# Patient Record
Sex: FEMALE | Race: WHITE | Employment: UNEMPLOYED | URBAN - METROPOLITAN AREA
[De-identification: names, ages, dates, MRNs, and addresses within clinical notes are randomized per-mention and may not be internally consistent; named-entity substitution may affect disease eponyms.]

---

## 2018-04-09 ENCOUNTER — HOSPITAL ENCOUNTER (OUTPATIENT)
Dept: PHYSICAL THERAPY | Age: 83
Setting detail: THERAPIES SERIES
Discharge: HOME | End: 2018-04-09
Payer: MEDICARE

## 2018-04-09 PROCEDURE — G8979 MOBILITY GOAL STATUS: HCPCS

## 2018-04-09 PROCEDURE — G8980 MOBILITY D/C STATUS: HCPCS

## 2018-04-09 PROCEDURE — G8978 MOBILITY CURRENT STATUS: HCPCS

## 2018-04-09 PROCEDURE — 97162 PT EVAL MOD COMPLEX 30 MIN: CPT

## 2018-04-09 ASSESSMENT — PAIN DESCRIPTION - ORIENTATION: ORIENTATION: RIGHT;ANTERIOR

## 2018-04-09 ASSESSMENT — PAIN DESCRIPTION - DESCRIPTORS: DESCRIPTORS: PRESSURE;THROBBING

## 2018-04-09 ASSESSMENT — PAIN DESCRIPTION - FREQUENCY: FREQUENCY: INTERMITTENT

## 2018-04-09 ASSESSMENT — PAIN SCALES - GENERAL: PAINLEVEL_OUTOF10: 0

## 2018-04-09 ASSESSMENT — PAIN DESCRIPTION - PAIN TYPE: TYPE: ACUTE PAIN

## 2018-04-09 ASSESSMENT — PAIN DESCRIPTION - LOCATION: LOCATION: HEAD

## 2018-06-28 ENCOUNTER — CLINICAL DOCUMENTATION (OUTPATIENT)
Dept: PHYSICAL THERAPY | Age: 83
End: 2018-06-28

## 2022-07-11 LAB
ANION GAP SERPL CALCULATED.3IONS-SCNC: 13 MEQ/L (ref 9–15)
BASOPHILS ABSOLUTE: 0.1 K/UL (ref 0–0.2)
BASOPHILS RELATIVE PERCENT: 0.8 %
BUN BLDV-MCNC: 13 MG/DL (ref 8–23)
CALCIUM SERPL-MCNC: 8.8 MG/DL (ref 8.5–9.9)
CHLORIDE BLD-SCNC: 105 MEQ/L (ref 95–107)
CHOLESTEROL, TOTAL: 150 MG/DL (ref 0–199)
CO2: 24 MEQ/L (ref 20–31)
CREAT SERPL-MCNC: 0.59 MG/DL (ref 0.5–0.9)
EOSINOPHILS ABSOLUTE: 0 K/UL (ref 0–0.7)
EOSINOPHILS RELATIVE PERCENT: 0.6 %
GFR AFRICAN AMERICAN: >60
GFR NON-AFRICAN AMERICAN: >60
GLUCOSE BLD-MCNC: 134 MG/DL (ref 70–99)
HCT VFR BLD CALC: 48 % (ref 37–47)
HDLC SERPL-MCNC: 34 MG/DL (ref 40–59)
HEMOGLOBIN: 16 G/DL (ref 12–16)
LDL CHOLESTEROL CALCULATED: 100 MG/DL (ref 0–129)
LYMPHOCYTES ABSOLUTE: 1.3 K/UL (ref 1–4.8)
LYMPHOCYTES RELATIVE PERCENT: 21 %
MCH RBC QN AUTO: 31.2 PG (ref 27–31.3)
MCHC RBC AUTO-ENTMCNC: 33.2 % (ref 33–37)
MCV RBC AUTO: 94 FL (ref 82–100)
MONOCYTES ABSOLUTE: 0.4 K/UL (ref 0.2–0.8)
MONOCYTES RELATIVE PERCENT: 5.9 %
NEUTROPHILS ABSOLUTE: 4.6 K/UL (ref 1.4–6.5)
NEUTROPHILS RELATIVE PERCENT: 71.7 %
PDW BLD-RTO: 13.7 % (ref 11.5–14.5)
PLATELET # BLD: 128 K/UL (ref 130–400)
POTASSIUM SERPL-SCNC: 3.8 MEQ/L (ref 3.4–4.9)
RBC # BLD: 5.11 M/UL (ref 4.2–5.4)
SODIUM BLD-SCNC: 142 MEQ/L (ref 135–144)
TRIGL SERPL-MCNC: 78 MG/DL (ref 0–150)
WBC # BLD: 6.4 K/UL (ref 4.8–10.8)

## 2022-07-11 RX ORDER — LANOLIN ALCOHOL/MO/W.PET/CERES
6 CREAM (GRAM) TOPICAL NIGHTLY PRN
COMMUNITY

## 2022-07-11 RX ORDER — AMOXICILLIN 500 MG/1
500 CAPSULE ORAL 3 TIMES DAILY
COMMUNITY

## 2022-07-11 RX ORDER — ATORVASTATIN CALCIUM 40 MG/1
40 TABLET, FILM COATED ORAL DAILY
COMMUNITY

## 2022-07-11 RX ORDER — PANTOPRAZOLE SODIUM 40 MG/1
40 TABLET, DELAYED RELEASE ORAL DAILY
COMMUNITY

## 2022-07-11 RX ORDER — DILTIAZEM HYDROCHLORIDE 120 MG/1
120 CAPSULE, EXTENDED RELEASE ORAL DAILY
COMMUNITY

## 2022-07-12 ENCOUNTER — OFFICE VISIT (OUTPATIENT)
Dept: GERIATRIC MEDICINE | Age: 87
End: 2022-07-12
Payer: MEDICARE

## 2022-07-12 DIAGNOSIS — M15.9 OSTEOARTHRITIS OF MULTIPLE JOINTS, UNSPECIFIED OSTEOARTHRITIS TYPE: ICD-10-CM

## 2022-07-12 DIAGNOSIS — I10 ESSENTIAL HYPERTENSION: ICD-10-CM

## 2022-07-12 DIAGNOSIS — G20 PARKINSON'S DISEASE (HCC): Primary | ICD-10-CM

## 2022-07-12 DIAGNOSIS — I48.91 ATRIAL FIBRILLATION, UNSPECIFIED TYPE (HCC): ICD-10-CM

## 2022-07-12 DIAGNOSIS — R26.81 UNSTEADINESS: ICD-10-CM

## 2022-07-12 PROCEDURE — 1123F ACP DISCUSS/DSCN MKR DOCD: CPT | Performed by: INTERNAL MEDICINE

## 2022-07-12 PROCEDURE — 99305 1ST NF CARE MODERATE MDM 35: CPT | Performed by: INTERNAL MEDICINE

## 2022-07-13 ENCOUNTER — OFFICE VISIT (OUTPATIENT)
Dept: GERIATRIC MEDICINE | Age: 87
End: 2022-07-13
Payer: MEDICARE

## 2022-07-13 DIAGNOSIS — I48.91 ATRIAL FIBRILLATION, UNSPECIFIED TYPE (HCC): ICD-10-CM

## 2022-07-13 DIAGNOSIS — I10 ESSENTIAL HYPERTENSION: ICD-10-CM

## 2022-07-13 DIAGNOSIS — R13.10 DYSPHAGIA, UNSPECIFIED TYPE: ICD-10-CM

## 2022-07-13 DIAGNOSIS — I95.1 ORTHOSTATIC HYPOTENSION: Primary | ICD-10-CM

## 2022-07-13 DIAGNOSIS — Z86.73 H/O: CVA (CEREBROVASCULAR ACCIDENT): ICD-10-CM

## 2022-07-13 DIAGNOSIS — G20 PARKINSON'S DISEASE (HCC): ICD-10-CM

## 2022-07-13 DIAGNOSIS — N39.0 URINARY TRACT INFECTION WITHOUT HEMATURIA, SITE UNSPECIFIED: ICD-10-CM

## 2022-07-13 DIAGNOSIS — I25.10 CORONARY ARTERY DISEASE INVOLVING NATIVE HEART, UNSPECIFIED VESSEL OR LESION TYPE, UNSPECIFIED WHETHER ANGINA PRESENT: ICD-10-CM

## 2022-07-13 DIAGNOSIS — E78.5 HYPERLIPIDEMIA, UNSPECIFIED HYPERLIPIDEMIA TYPE: ICD-10-CM

## 2022-07-13 PROCEDURE — 1123F ACP DISCUSS/DSCN MKR DOCD: CPT | Performed by: NURSE PRACTITIONER

## 2022-07-13 PROCEDURE — 99310 SBSQ NF CARE HIGH MDM 45: CPT | Performed by: NURSE PRACTITIONER

## 2022-07-14 LAB
ANION GAP SERPL CALCULATED.3IONS-SCNC: 13 MEQ/L (ref 9–15)
BASOPHILS ABSOLUTE: 0 K/UL (ref 0–0.2)
BASOPHILS RELATIVE PERCENT: 0.7 %
BUN BLDV-MCNC: 13 MG/DL (ref 8–23)
CALCIUM SERPL-MCNC: 9.5 MG/DL (ref 8.5–9.9)
CHLORIDE BLD-SCNC: 104 MEQ/L (ref 95–107)
CHOLESTEROL, TOTAL: 167 MG/DL (ref 0–199)
CO2: 22 MEQ/L (ref 20–31)
CREAT SERPL-MCNC: 0.49 MG/DL (ref 0.5–0.9)
EOSINOPHILS ABSOLUTE: 0.1 K/UL (ref 0–0.7)
EOSINOPHILS RELATIVE PERCENT: 1.1 %
GFR AFRICAN AMERICAN: >60
GFR NON-AFRICAN AMERICAN: >60
GLUCOSE BLD-MCNC: 95 MG/DL (ref 70–99)
HCT VFR BLD CALC: 48 % (ref 37–47)
HDLC SERPL-MCNC: 35 MG/DL (ref 40–59)
HEMOGLOBIN: 15.6 G/DL (ref 12–16)
LDL CHOLESTEROL CALCULATED: 112 MG/DL (ref 0–129)
LYMPHOCYTES ABSOLUTE: 1.6 K/UL (ref 1–4.8)
LYMPHOCYTES RELATIVE PERCENT: 27 %
MCH RBC QN AUTO: 30.8 PG (ref 27–31.3)
MCHC RBC AUTO-ENTMCNC: 32.4 % (ref 33–37)
MCV RBC AUTO: 95.1 FL (ref 82–100)
MONOCYTES ABSOLUTE: 0.5 K/UL (ref 0.2–0.8)
MONOCYTES RELATIVE PERCENT: 7.7 %
NEUTROPHILS ABSOLUTE: 3.8 K/UL (ref 1.4–6.5)
NEUTROPHILS RELATIVE PERCENT: 63.5 %
PDW BLD-RTO: 14 % (ref 11.5–14.5)
PLATELET # BLD: 123 K/UL (ref 130–400)
POTASSIUM SERPL-SCNC: 3.7 MEQ/L (ref 3.4–4.9)
RBC # BLD: 5.04 M/UL (ref 4.2–5.4)
SODIUM BLD-SCNC: 139 MEQ/L (ref 135–144)
TRIGL SERPL-MCNC: 102 MG/DL (ref 0–150)
WBC # BLD: 6.1 K/UL (ref 4.8–10.8)

## 2022-07-15 ENCOUNTER — OFFICE VISIT (OUTPATIENT)
Dept: GERIATRIC MEDICINE | Age: 87
End: 2022-07-15
Payer: MEDICARE

## 2022-07-15 DIAGNOSIS — R11.0 NAUSEA: Primary | ICD-10-CM

## 2022-07-15 DIAGNOSIS — H04.123 DRY EYES: ICD-10-CM

## 2022-07-15 PROCEDURE — 1123F ACP DISCUSS/DSCN MKR DOCD: CPT | Performed by: NURSE PRACTITIONER

## 2022-07-15 PROCEDURE — 99309 SBSQ NF CARE MODERATE MDM 30: CPT | Performed by: NURSE PRACTITIONER

## 2022-07-18 LAB
BASOPHILS ABSOLUTE: 0 K/UL (ref 0–0.2)
BASOPHILS RELATIVE PERCENT: 1 %
EOSINOPHILS ABSOLUTE: 0.1 K/UL (ref 0–0.7)
EOSINOPHILS RELATIVE PERCENT: 1.4 %
HCT VFR BLD CALC: 47.7 % (ref 37–47)
HEMOGLOBIN: 15.9 G/DL (ref 12–16)
LYMPHOCYTES ABSOLUTE: 1.5 K/UL (ref 1–4.8)
LYMPHOCYTES RELATIVE PERCENT: 33 %
MCH RBC QN AUTO: 31 PG (ref 27–31.3)
MCHC RBC AUTO-ENTMCNC: 33.3 % (ref 33–37)
MCV RBC AUTO: 93.1 FL (ref 82–100)
MONOCYTES ABSOLUTE: 0.3 K/UL (ref 0.2–0.8)
MONOCYTES RELATIVE PERCENT: 6.8 %
NEUTROPHILS ABSOLUTE: 2.6 K/UL (ref 1.4–6.5)
NEUTROPHILS RELATIVE PERCENT: 57.8 %
PDW BLD-RTO: 13.8 % (ref 11.5–14.5)
PLATELET # BLD: 137 K/UL (ref 130–400)
RBC # BLD: 5.13 M/UL (ref 4.2–5.4)
WBC # BLD: 4.5 K/UL (ref 4.8–10.8)

## 2022-07-22 ENCOUNTER — OFFICE VISIT (OUTPATIENT)
Dept: GERIATRIC MEDICINE | Age: 87
End: 2022-07-22
Payer: MEDICARE

## 2022-07-22 DIAGNOSIS — E44.0 MODERATE PROTEIN-CALORIE MALNUTRITION (HCC): Primary | ICD-10-CM

## 2022-07-22 PROCEDURE — 1123F ACP DISCUSS/DSCN MKR DOCD: CPT | Performed by: NURSE PRACTITIONER

## 2022-07-22 PROCEDURE — 99309 SBSQ NF CARE MODERATE MDM 30: CPT | Performed by: NURSE PRACTITIONER

## 2022-07-27 ENCOUNTER — OFFICE VISIT (OUTPATIENT)
Dept: GERIATRIC MEDICINE | Age: 87
End: 2022-07-27
Payer: MEDICARE

## 2022-07-27 DIAGNOSIS — R11.0 NAUSEA: Primary | ICD-10-CM

## 2022-07-27 PROCEDURE — 99309 SBSQ NF CARE MODERATE MDM 30: CPT | Performed by: NURSE PRACTITIONER

## 2022-07-27 PROCEDURE — 1123F ACP DISCUSS/DSCN MKR DOCD: CPT | Performed by: NURSE PRACTITIONER

## 2022-08-07 PROBLEM — N39.0 URINARY TRACT INFECTION WITHOUT HEMATURIA: Status: ACTIVE | Noted: 2022-08-07

## 2022-08-07 PROBLEM — I25.10 CORONARY ARTERY DISEASE INVOLVING NATIVE HEART: Status: ACTIVE | Noted: 2022-08-07

## 2022-08-07 PROBLEM — E78.5 HYPERLIPIDEMIA: Status: ACTIVE | Noted: 2022-08-07

## 2022-08-07 PROBLEM — R13.10 DYSPHAGIA: Status: ACTIVE | Noted: 2022-08-07

## 2022-08-07 PROBLEM — I95.1 ORTHOSTATIC HYPOTENSION: Status: ACTIVE | Noted: 2022-08-07

## 2022-08-07 PROBLEM — G20 PARKINSON'S DISEASE (HCC): Status: ACTIVE | Noted: 2022-08-07

## 2022-08-07 PROBLEM — I48.91 ATRIAL FIBRILLATION (HCC): Status: ACTIVE | Noted: 2022-08-07

## 2022-08-07 PROBLEM — I10 ESSENTIAL HYPERTENSION: Status: ACTIVE | Noted: 2022-08-07

## 2022-08-07 PROBLEM — Z86.73 H/O: CVA (CEREBROVASCULAR ACCIDENT): Status: ACTIVE | Noted: 2022-08-07

## 2022-08-07 NOTE — PROGRESS NOTES
Nursing Home:  [address] [place] [address]    The patient is being seen today for follow-up after recent admission to this facility for:  Chief Complaint   Patient presents with    Follow-Up from Hospital         SUBJECTIVE: Patient being seen today for follow-up after recent admission to this facility with primary diagnosis of orthostatic hypotension, UTI, CAD, A. fib, history of CVA, essential hypertension, hyperlipidemia, dysphagia, and Parkinson's disease. FAMILY AND SOCIAL HISTORY:  Refer to H&P. No past medical history on file. No family history on file.   Social History     Socioeconomic History    Marital status: Unknown     Spouse name: Not on file    Number of children: Not on file    Years of education: Not on file    Highest education level: Not on file   Occupational History    Not on file   Tobacco Use    Smoking status: Not on file    Smokeless tobacco: Not on file   Substance and Sexual Activity    Alcohol use: Not on file    Drug use: Not on file    Sexual activity: Not on file   Other Topics Concern    Not on file   Social History Narrative    Not on file     Social Determinants of Health     Financial Resource Strain: Not on file   Food Insecurity: Not on file   Transportation Needs: Not on file   Physical Activity: Not on file   Stress: Not on file   Social Connections: Not on file   Intimate Partner Violence: Not on file   Housing Stability: Not on file     ALLERGIES:  Gadolinium derivatives    MEDICATIONS: Acetaminophen 325 mg 2 tabs p.o. every 4 as needed for fever or pain, amoxicillin 500 mg capsule p.o. 3 times daily, apixaban 2.5 mg tab p.o. twice daily, atorvastatin calcium 40 mg tab p.o. daily, diltiazem CD extended release tab 120 mg tab p.o. daily, Dulcolax suppository 10 mg rectal suppository daily as needed, Alannah-Spur liquid 100 mg per 5 mL give 10 mL p.o. every 4 as needed, melatonin 3 mg tab 2 tabs p.o. daily as needed for insomnia total dose 6 mg, metoprolol tartrate 25 mg tab p.o. twice daily, milk of magnesia suspension 1200 mg per 15 mL give 30 mL p.o. daily as needed, Mylanta suspension 200-200-20 milligrams per 5 mL give 30 mL p.o. every 4 as needed, pantoprazole sodium tablet delayed release 40 mg tab p.o. daily, Zofran 4 mg tab p.o. every 6 as needed. REVIEW OF SYSTEMS:  Resident denies any headache, dizziness, blurred vision. She denies any fever, chills, sore throat. She denies any rashes, bruises, or open areas. She denies any chest pain, chest discomfort, or heart palpitations. She denies any shortness of breath or cough. She reports persistent nausea. No vomiting, or abdominal pain. She denies any urinary urgency, frequency, or dysuria. She denies any constipation or diarrhea. She states she is eating okay when not nauseated, sleeping okay, and she currently has no pain. PHYSICAL EXAMINATION:  Most recent vital signs: Blood pressure 123/68, temp 97.8, heart rate 89, respirations 18, pulse ox 98% room air, weight 111 pounds. Constitutional:  Resident is a 80 y.o. female alert, frail, pleasant, and cooperative with exam.  In no apparent distress. Integument:  Pink, warm, dry. No visible rashes, bruises, or open areas. HEENT:  Normocephalic, atraumatic. External ears appear to be within normal limits. Hard of hearing. Conjunctivae pink. Sclerae nonicteric. Mucous membranes pink, moist.  No oropharyngeal exudate. Neck:  Supple. No cervical or clavicular lymphadenopathy. No masses noted. Cardiovascular:  Heart rate regular rate and rhythm. No murmurs, gallops, rubs noted. Respiratory:  Lung sounds Normal.  Respirations nonlabored. The patient is not using accessory muscles with breathing. Current pulse ox 98% room air. Abdomen:  Soft, nontender, nondistended, normoactive bowel sounds. No masses noted. Musculoskeletal: No muscle tenderness. No joint tenderness. PV:  Peripheral pulses present.   She  does not have any edema to BLE. Psychological: Mood stable. Affect pleasant. Speech normal rate and tone. ASSESSMENT AND PLAN:      Diagnosis Orders   1. Orthostatic hypotension        2. Urinary tract infection without hematuria, site unspecified        3. Coronary artery disease involving native heart, unspecified vessel or lesion type, unspecified whether angina present        4. [facility]rial fibrillation, unspecified type ([facility]        5. H/O: CVA (cerebrovascular accident)        6. Essential hypertension        7. Hyperlipidemia, unspecified hyperlipidemia type        8. Dysphagia, unspecified type        9. Parkinson's disease ([facility]             No further episodes since admission to this facility. TRISTAN hose knee-high on in the a.m. off in the p.m. Tolerating amoxicillin without incident. No chest pain or discomfort. No palpitations that she is aware of. Continue apixaban and diltiazem as ordered. No extension of her symptoms. PT OT eval and treat. Blood pressure is stable. No hypertensive or hypotensive events. Metoprolol as ordered. Atorvastatin as ordered. Lipid panel in the morning. Speech therapy eval and treat. She is tolerating her diet as ordered. No exacerbation of her symptoms. PT/OT eval and treat. I have reviewed this resident's medication and treatment plan as well as most recent lab work. CBC, BMP, lipid panel in the morning. No further changes will be made at this time. Return in about 1 week (around 7/20/2022), or if symptoms worsen or fail to improve. Please note this report is partially produced by using speech recognition hardware. It may contain errors related to the system, including grammar, punctuation and spelling as well as words and phrases that may seem inaccurate. For any questions or concerns feel free to contact me for clarification        Electronically Signed By: Lucien Amin. Abi AGOSTO CNP on 8/7/22       ________________________    Lucien Amin.  [address], [address] [place] [address] [address], [address] [place] [address]  Office [phone]  Fax [phone]

## 2022-08-11 ENCOUNTER — OFFICE VISIT (OUTPATIENT)
Dept: GERIATRIC MEDICINE | Age: 87
End: 2022-08-11
Payer: MEDICARE

## 2022-08-11 DIAGNOSIS — I48.91 ATRIAL FIBRILLATION, UNSPECIFIED TYPE (HCC): ICD-10-CM

## 2022-08-11 DIAGNOSIS — E44.0 MODERATE PROTEIN-CALORIE MALNUTRITION (HCC): ICD-10-CM

## 2022-08-11 DIAGNOSIS — G20 PARKINSON'S DISEASE (HCC): Primary | ICD-10-CM

## 2022-08-11 PROBLEM — R11.0 NAUSEA: Status: ACTIVE | Noted: 2022-08-11

## 2022-08-11 PROBLEM — H04.123 DRY EYES: Status: ACTIVE | Noted: 2022-08-11

## 2022-08-11 PROCEDURE — 1123F ACP DISCUSS/DSCN MKR DOCD: CPT | Performed by: INTERNAL MEDICINE

## 2022-08-11 PROCEDURE — 99309 SBSQ NF CARE MODERATE MDM 30: CPT | Performed by: INTERNAL MEDICINE

## 2022-08-11 NOTE — PROGRESS NOTES
[facility]  7/15/2022    Dorothy Pagan  is a 80 y.o. in the [place] being seen for a acute visit for   Chief Complaint   Patient presents with    Nausea    Dry Eye       HPI patient being seen today for acute visit for nausea and dry eyes. Patient reports her nausea medication is ineffective. She is requesting that she be given another medication. Otherwise she is complaining of dry eyes would like to know if there are eyedrops she can have. They are eating and drinking at their baseline per nursing. They have had no recent falls with injuries. They are not complaining of any un addressed pain issues. Have had no recent choking or dysphagia issues. NO agitation or unusual mental behavioral issues. No past medical history on file. No past surgical history on file. No family history on file. Social History     Socioeconomic History    Marital status: Unknown     Spouse name: Not on file    Number of children: Not on file    Years of education: Not on file    Highest education level: Not on file   Occupational History    Not on file   Tobacco Use    Smoking status: Not on file    Smokeless tobacco: Not on file   Substance and Sexual Activity    Alcohol use: Not on file    Drug use: Not on file    Sexual activity: Not on file   Other Topics Concern    Not on file   Social History Narrative    Not on file     Social Determinants of Health     Financial Resource Strain: Not on file   Food Insecurity: Not on file   Transportation Needs: Not on file   Physical Activity: Not on file   Stress: Not on file   Social Connections: Not on file   Intimate Partner Violence: Not on file   Housing Stability: Not on file       Allergies: Gadolinium derivatives  NF MEDICATIONS REVIEWED    ROS:   Constitutional: There are no reports of behavioral issues, change in appetite, fever, or increased weakness. No bleeding issues.   Respiratory: denies SOB, dyspnea, dyspnea on exertion  Cardiovascular: denies CP,

## 2022-08-11 NOTE — PROGRESS NOTES
Patient Name: Carmelina Delaney    YOB: 1927  Medical Record Number: [id number]              History of Present Illness: This 72-year-old woman was admitted here after recent hospitalization at [facility].  Patient has a known history of atrial relation Parkinson's. Patient has ongoing intermittent nausea patient had a lightheadedness patient was found to be having episodes of poor functional decline patient did have recent CT of the head negative for acute intracranial bleed. Patient did have abdomen CT showed dilation of the common bile duct related to postcholecystectomy state. Patient was functionally stable she was given IV fluid she been ongoing intermittently nauseated patient has had prior esophageal balloon dilatations in the past.  Without evidence of acute dysphagia. Patient was seen by GI after stabilization see evidence of EGD which showed dysmotility and a prior stricture in the past.  Patient has been on Reglan she was stabilized transferred here for ongoing course of care. Today her oral intake is poor she is functionally weak but at her baseline. Pain-free. Review of Systems   Constitutional:  Positive for activity change, appetite change and fatigue. Negative for diaphoresis. HENT:  Negative for congestion. Respiratory:  Positive for cough and chest tightness. Cardiovascular:  Negative for chest pain, palpitations and leg swelling. Gastrointestinal:  Positive for constipation. Musculoskeletal:  Positive for arthralgias and back pain. Neurological:  Positive for weakness. Psychiatric/Behavioral:  Negative for agitation. All other systems reviewed and are negative. Review of Systems: All 14 review of systems negative other than as stated above           No past medical history on file. No past surgical history on file.       Current Outpatient Medications on File Prior to Visit   Medication Sig Dispense Refill    amoxicillin (AMOXIL) 500 MG capsule Take 500 mg by mouth 3 times daily Indications: Urinary Tract Infection      apixaban (ELIQUIS) 2.5 MG TABS tablet Take 2.5 mg by mouth 2 times daily Indications: Atrial Fibrillation      atorvastatin (LIPITOR) 40 MG tablet Take 40 mg by mouth daily Indications: High Amount of Fats in the Blood      dilTIAZem (CARDIZEM 12 HR) 120 MG extended release capsule Take 120 mg by mouth daily Indications: High Blood Pressure Disorder      melatonin 3 MG TABS tablet Take 6 mg by mouth nightly as needed Indications: Trouble Sleeping Two  3mg tablets      metoprolol tartrate (LOPRESSOR) 25 MG tablet Take 25 mg by mouth 2 times daily Indications: High Blood Pressure Disorder      pantoprazole (PROTONIX) 40 MG tablet Take 40 mg by mouth daily Indications: Gastroesophageal Reflux Disease       No current facility-administered medications on file prior to visit. Allergies   Allergen Reactions    Gadolinium Derivatives          No family history on file. Physical Exam:      Physical Exam  Vitals and nursing note reviewed. Constitutional:       Comments: Bilateral temporal wasting   HENT:      Head: Normocephalic and atraumatic. Nose: Nose normal.      Mouth/Throat:      Mouth: Mucous membranes are dry. Eyes:      Extraocular Movements: Extraocular movements intact. Cardiovascular:      Rate and Rhythm: Regular rhythm. Heart sounds: Murmur heard. Pulmonary:      Effort: Pulmonary effort is normal.   Abdominal:      General: Bowel sounds are normal. There is no distension. Palpations: Abdomen is soft. Musculoskeletal:         General: Swelling present. Cervical back: Neck supple. Skin:     General: Skin is warm. Neurological:      Mental Status: Mental status is at baseline. Motor: Weakness present. Psychiatric:         Mood and Affect: Mood normal.       There were no vitals taken for this visit.       .   Lab Results   Component Value Date    WBC 4.5 (L) 07/18/2022    HGB 15.9 07/18/2022    HCT 47.7 (H) 07/18/2022    MCV 93.1 07/18/2022     07/18/2022     Lab Results   Component Value Date/Time     07/14/2022 09:15 AM    K 3.7 07/14/2022 09:15 AM     07/14/2022 09:15 AM    CO2 22 07/14/2022 09:15 AM    BUN 13 07/14/2022 09:15 AM    CREATININE 0.49 07/14/2022 09:15 AM    GLUCOSE 95 07/14/2022 09:15 AM    CALCIUM 9.5 07/14/2022 09:15 AM                ASSESSMENT:  Patient Active Problem List   Diagnosis    Orthostatic hypotension    Urinary tract infection without hematuria    Coronary artery disease involving native heart    Atrial fibrillation (HCC)    H/O: CVA (cerebrovascular accident)    Essential hypertension    Hyperlipidemia    Dysphagia    Parkinson's disease (HCC)    Nausea    Dry eyes         PLAN:   Diagnosis Orders   1. Parkinson's disease ([facility]        2. Essential hypertension        3. Atrial fibrillation, unspecified type ([address]        4. Unsteadiness        5. Osteoarthritis of multiple joints, unspecified osteoarthritis type          Continue supportive care for support monitoring nutritional intake. Course of physical therapy. Occupational Therapy. Continue with cognitive reassessment will benefit follow-up with speech therapy. Follow-up CBC BMP pending. Monitoring heart rate now with no RVR. Remains anticoagulated no bleed diathesis given the extremes of age however may consider reduction. Patient may benefit from follow-up with neurology for her Parkinson's. Repeat CBC BMP pending at this time. Please note orders entered on site at facility after discussion with appropriate facility nursing/therapy/ / nutritional staff. Current longstanding medical problems and acute medical issues addressed with staff. Available data and data elements in on site paper chart reviewed and analyzed. Current external consultant notes reviewed in on site chart. Ordered laboratory testing and imaging will be reviewed when available.    This patient's need for psychiatric medication has been reviewed. Will consider further adjustment and possible further evaluations by mental health services.

## 2022-08-12 ENCOUNTER — OFFICE VISIT (OUTPATIENT)
Dept: GERIATRIC MEDICINE | Age: 87
End: 2022-08-12
Payer: MEDICARE

## 2022-08-12 DIAGNOSIS — R11.0 NAUSEA: Primary | ICD-10-CM

## 2022-08-12 PROCEDURE — 99309 SBSQ NF CARE MODERATE MDM 30: CPT | Performed by: NURSE PRACTITIONER

## 2022-08-12 PROCEDURE — 1123F ACP DISCUSS/DSCN MKR DOCD: CPT | Performed by: NURSE PRACTITIONER

## 2022-08-15 ENCOUNTER — OFFICE VISIT (OUTPATIENT)
Dept: GERIATRIC MEDICINE | Age: 87
End: 2022-08-15
Payer: MEDICARE

## 2022-08-15 DIAGNOSIS — R63.4 ABNORMAL WEIGHT LOSS: Primary | ICD-10-CM

## 2022-08-15 PROCEDURE — 99309 SBSQ NF CARE MODERATE MDM 30: CPT | Performed by: NURSE PRACTITIONER

## 2022-08-15 PROCEDURE — 1123F ACP DISCUSS/DSCN MKR DOCD: CPT | Performed by: NURSE PRACTITIONER

## 2022-08-16 LAB
ALBUMIN SERPL-MCNC: 3.5 G/DL (ref 3.5–4.6)
ALP BLD-CCNC: 62 U/L (ref 40–130)
ALT SERPL-CCNC: 12 U/L (ref 0–33)
ANION GAP SERPL CALCULATED.3IONS-SCNC: 13 MEQ/L (ref 9–15)
AST SERPL-CCNC: 18 U/L (ref 0–35)
BASOPHILS ABSOLUTE: 0 K/UL (ref 0–0.2)
BASOPHILS RELATIVE PERCENT: 0.8 %
BILIRUB SERPL-MCNC: 0.8 MG/DL (ref 0.2–0.7)
BUN BLDV-MCNC: 14 MG/DL (ref 8–23)
CALCIUM SERPL-MCNC: 9 MG/DL (ref 8.5–9.9)
CHLORIDE BLD-SCNC: 107 MEQ/L (ref 95–107)
CO2: 21 MEQ/L (ref 20–31)
CREAT SERPL-MCNC: 0.59 MG/DL (ref 0.5–0.9)
EOSINOPHILS ABSOLUTE: 0.1 K/UL (ref 0–0.7)
EOSINOPHILS RELATIVE PERCENT: 1.5 %
GFR AFRICAN AMERICAN: >60
GFR NON-AFRICAN AMERICAN: >60
GLOBULIN: 2.5 G/DL (ref 2.3–3.5)
GLUCOSE BLD-MCNC: 107 MG/DL (ref 70–99)
HCT VFR BLD CALC: 45.3 % (ref 37–47)
HEMOGLOBIN: 15.4 G/DL (ref 12–16)
LYMPHOCYTES ABSOLUTE: 1.4 K/UL (ref 1–4.8)
LYMPHOCYTES RELATIVE PERCENT: 26.1 %
MCH RBC QN AUTO: 31.1 PG (ref 27–31.3)
MCHC RBC AUTO-ENTMCNC: 34 % (ref 33–37)
MCV RBC AUTO: 91.6 FL (ref 82–100)
MONOCYTES ABSOLUTE: 0.4 K/UL (ref 0.2–0.8)
MONOCYTES RELATIVE PERCENT: 8.1 %
NEUTROPHILS ABSOLUTE: 3.5 K/UL (ref 1.4–6.5)
NEUTROPHILS RELATIVE PERCENT: 63.5 %
PDW BLD-RTO: 14.4 % (ref 11.5–14.5)
PLATELET # BLD: 141 K/UL (ref 130–400)
POTASSIUM SERPL-SCNC: 3.6 MEQ/L (ref 3.4–4.9)
RBC # BLD: 4.94 M/UL (ref 4.2–5.4)
SODIUM BLD-SCNC: 141 MEQ/L (ref 135–144)
TOTAL PROTEIN: 6 G/DL (ref 6.3–8)
WBC # BLD: 5.5 K/UL (ref 4.8–10.8)

## 2022-08-17 PROBLEM — E44.0 MODERATE PROTEIN-CALORIE MALNUTRITION (HCC): Status: ACTIVE | Noted: 2022-08-17

## 2022-08-17 NOTE — PROGRESS NOTES
[facility]  7/22/2022    Anurag Rodriguez  is a 80 y.o. in the  being seen for a acute visit for   Chief Complaint   Patient presents with    Other     malnutrition       HPI patient is being seen today for acute visit for moderate protein calorie malnutrition. Nursing staff report resident's weight is down 8 pounds since her admission on 7/9/22. They are eating and drinking at their baseline per nursing. They do have nausea. They have had no recent falls with injuries. They are not complaining of any un addressed pain issues. Have had no recent choking or dysphagia issues. NO agitation or unusual mental behavioral issues. No past medical history on file. No past surgical history on file. No family history on file. Social History     Socioeconomic History    Marital status: Unknown     Spouse name: Not on file    Number of children: Not on file    Years of education: Not on file    Highest education level: Not on file   Occupational History    Not on file   Tobacco Use    Smoking status: Not on file    Smokeless tobacco: Not on file   Substance and Sexual Activity    Alcohol use: Not on file    Drug use: Not on file    Sexual activity: Not on file   Other Topics Concern    Not on file   Social History Narrative    Not on file     Social Determinants of Health     Financial Resource Strain: Not on file   Food Insecurity: Not on file   Transportation Needs: Not on file   Physical Activity: Not on file   Stress: Not on file   Social Connections: Not on file   Intimate Partner Violence: Not on file   Housing Stability: Not on file       Allergies: Gadolinium derivatives  NF MEDICATIONS REVIEWED    ROS:   Constitutional: There are no reports of behavioral issues, change in appetite, fever, or increased weakness. No bleeding issues. Respiratory: denies SOB, dyspnea, dyspnea on exertion  Cardiovascular: denies CP, lightheadedness, palpitations, PND, orthopnea, or claudication. GI: No N/V/D. : no reports of dysuria, frequency or urgency  Extremities: No reports of pain issues, edema  Psych: at baseline cognition    Physical exam:   Blood pressure 138/76, temp 96.9, heart rate 78, respirations 18, pulse ox 96%, weight 103 pounds. Constitutional: Awake and alert sitting up in bed, general weakness  HEENT: Normocephalic, hard of hearing,intact facial symmetry, conjunctiva pink, sclera non icteric,  buccal mucosa pink and moist  Speech clear. NECK: - no cervical or clavicular lymphadenopathy, euthyroid, no mass visualized  Cardiovascular: Regular rate, and rhythm. No murmurs, gallops or rubs noted. Respiratory: LCTA, even unlabored respirations, no accessory muscle use noted  GI: abdomen NT, +BS x 4 Q, ND  : No suprapubic tenderness  Extremities: no ankle edema, PPP  SKELETAL: no redness, or swelling over joints. Limited ROM but spontaneous movement x 4   Psych: pleasantly confused, good judgement, normal thought content  SKIN: no gross skin lesions noted on visualized skin, warm and dry    ASSESSMENT:     Diagnosis Orders   1. Moderate protein-calorie malnutrition ([address]            PLAN:  Pt/POA agrees with POC   Dietitian consult. Patient is currently in a regular diet, regular consistency. She does receive mighty shakes twice daily. She does have Phenergan for her nausea. Return in about 1 week (around 7/29/2022), or if symptoms worsen or fail to improve. Please note this report is partially produced by using speech recognition hardware. It may contain errors related to the system, including grammar, punctuation and spelling as well as words and phrases that may seem inaccurate. For any questions or concerns feel free to contact me for clarification           ________________________    Sterling Pap. Obed Hammans [facility], [address], [address] [place] [address]  Office [phone]  Fax [phone]

## 2022-08-23 ENCOUNTER — OFFICE VISIT (OUTPATIENT)
Dept: GERIATRIC MEDICINE | Age: 87
End: 2022-08-23
Payer: MEDICARE

## 2022-08-23 DIAGNOSIS — E44.0 MODERATE PROTEIN-CALORIE MALNUTRITION (HCC): Primary | ICD-10-CM

## 2022-08-23 DIAGNOSIS — I10 ESSENTIAL HYPERTENSION: ICD-10-CM

## 2022-08-23 DIAGNOSIS — G20 PARKINSON'S DISEASE (HCC): ICD-10-CM

## 2022-08-23 PROCEDURE — 99309 SBSQ NF CARE MODERATE MDM 30: CPT | Performed by: INTERNAL MEDICINE

## 2022-08-23 PROCEDURE — 1123F ACP DISCUSS/DSCN MKR DOCD: CPT | Performed by: INTERNAL MEDICINE

## 2022-08-23 NOTE — PROGRESS NOTES
[facility]  7/27/2022    Samir Hannon  is a 80 y.o. in the  being seen for a acute visit for   Chief Complaint   Patient presents with    Nausea       HPI patient being seen today for acute visit for nausea. Intake varies due to persistent nausea per nursing staff. They have had no recent falls with injuries. They are not complaining of any un addressed pain issues. Have had no recent choking or dysphagia issues. NO agitation or unusual mental behavioral issues. No past medical history on file. No past surgical history on file. No family history on file. Social History     Socioeconomic History    Marital status: Unknown     Spouse name: Not on file    Number of children: Not on file    Years of education: Not on file    Highest education level: Not on file   Occupational History    Not on file   Tobacco Use    Smoking status: Not on file    Smokeless tobacco: Not on file   Substance and Sexual Activity    Alcohol use: Not on file    Drug use: Not on file    Sexual activity: Not on file   Other Topics Concern    Not on file   Social History Narrative    Not on file     Social Determinants of Health     Financial Resource Strain: Not on file   Food Insecurity: Not on file   Transportation Needs: Not on file   Physical Activity: Not on file   Stress: Not on file   Social Connections: Not on file   Intimate Partner Violence: Not on file   Housing Stability: Not on file       Allergies: Gadolinium derivatives  NF MEDICATIONS REVIEWED    ROS:   Constitutional: There are no reports of behavioral issues, change in appetite, fever, or increased weakness. No bleeding issues. Respiratory: denies SOB, dyspnea, dyspnea on exertion  Cardiovascular: denies CP, lightheadedness, palpitations, PND, orthopnea, or claudication. GI: Reports persistent nausea.    : no reports of dysuria, frequency or urgency  Extremities: No reports of pain issues, edema  Psych: at baseline confusion     Physical exam:

## 2022-08-27 ASSESSMENT — ENCOUNTER SYMPTOMS
CONSTIPATION: 1
BACK PAIN: 1
CHEST TIGHTNESS: 1
COUGH: 1

## 2022-08-31 PROBLEM — R63.4 ABNORMAL WEIGHT LOSS: Status: ACTIVE | Noted: 2022-08-31

## 2022-08-31 NOTE — PROGRESS NOTES
exam:   /76, temperature 97.6, heart rate 79, respirations 17, spo2 96%, 99.9lbs  Constitutional: Awake and alert, frail, sitting up in bed, general weakness  HEENT: Normocephalic, [place],conjunctiva pink, sclera non icteric,  buccal mucosa pink and moist  Speech clear. NECK: - no cervical or clavicular lymphadenopathy, euthyroid, no mass visualized  Cardiovascular: Regular rate, and rhythm. No murmurs, gallops or rubs noted. Respiratory: LCTA, even unlabored respirations, no accessory muscle use noted  GI: abdomen NT, +BS x 4 Q, ND  : no suprapubic tenderness  Extremities: no ankle edema, PPP  SKELETAL: no redness, or swelling over joints. Limited ROM but spontaneous movement x 4   Psych: pleasantly confused, good judgement, normal thought content  SKIN: no gross skin lesions noted on visualized skin, warm and dry    ASSESSMENT:     Diagnosis Orders   1. Nausea            PLAN:  Pt/POA agrees with POC   Antiemetic medication effective  Schedule F/U Dr. Ernesto Clemens, GI    Return in about 1 week (around 8/19/2022), or if symptoms worsen or fail to improve. Please note this report is partially produced by using speech recognition hardware. It may contain errors related to the system, including grammar, punctuation and spelling as well as words and phrases that may seem inaccurate. For any questions or concerns feel free to contact me for clarification       ________________________    Mehnaz Metzger.  Cesilia Wilde [facility], [address]  Office [phone]  Fax [phone]

## 2022-09-01 NOTE — PROGRESS NOTES
[facility]  8/15/2022    Ajit Vernon  is a 80 y.o. in the [place] being seen for a acute visit for   Chief Complaint   Patient presents with    Weight Loss       HPI patient being seen today for acute visit for abnormal weight loss. They are eating and drinking at their baseline per nursing. They have had no recent falls with injuries. They are not complaining of any un addressed pain issues. Have had no recent choking or dysphagia issues. NO agitation or unusual mental behavioral issues. No past medical history on file. No past surgical history on file. No family history on file. Social History     Socioeconomic History    Marital status: Unknown     Spouse name: Not on file    Number of children: Not on file    Years of education: Not on file    Highest education level: Not on file   Occupational History    Not on file   Tobacco Use    Smoking status: Not on file    Smokeless tobacco: Not on file   Substance and Sexual Activity    Alcohol use: Not on file    Drug use: Not on file    Sexual activity: Not on file   Other Topics Concern    Not on file   Social History Narrative    Not on file     Social Determinants of Health     Financial Resource Strain: Not on file   Food Insecurity: Not on file   Transportation Needs: Not on file   Physical Activity: Not on file   Stress: Not on file   Social Connections: Not on file   Intimate Partner Violence: Not on file   Housing Stability: Not on file       Allergies: Gadolinium derivatives  NF MEDICATIONS REVIEWED    ROS:   Constitutional: There are no reports of behavioral issues, change in appetite, fever, or increased weakness. No bleeding issues. Respiratory: denies SOB, dyspnea, dyspnea on exertion  Cardiovascular: denies CP, lightheadedness, palpitations, PND, orthopnea, or claudication. GI: Reports N/V, no diarrhea.    : no reports of dysuria, frequency or urgency  Extremities: No reports of pain issues, edema  Psych: at baseline confusion Physical exam:   Blood pressure 101/63, temp 98.3, heart rate 61, respirations 18, pulse ox 95% room air, weight 99.9 pounds. Constitutional: Awake and alert sitting up in bed, general weakness  HEENT: Normocephalic, hard of hearing,conjunctiva pink, sclera non icteric,  buccal mucosa pink and moist  Speech clear. NECK: - no cervical or clavicular lymphadenopathy, euthyroid, no mass visualized  Cardiovascular: Regular rate, and rhythm. No murmurs, gallops or rubs noted. Respiratory: LCTA, even unlabored respirations, no accessory muscle use noted  GI: abdomen NT, +BS x 4 Q, ND  : No suprapubic tenderness  Extremities: no ankle edema, PPP  SKELETAL: no redness, or swelling over joints. Limited ROM but spontaneous movement x 4   Psych: pleasantly confused, good judgement, normal thought content  SKIN: no gross skin lesions noted on visualized skin, warm and dry    ASSESSMENT:     Diagnosis Orders   1. Abnormal weight loss            PLAN:  Pt/POA agrees with POC   Patient's admit weight was 111 pounds, most current weight 99.9 pounds. Patient has complained of persistent nausea and vomiting. She does have Zofran as needed that nursing staff are offering prior to meals. CMP, CBC with differential in AM.    Return in about 1 week (around 8/22/2022), or if symptoms worsen or fail to improve. Please note this report is partially produced by using speech recognition hardware. It may contain errors related to the system, including grammar, punctuation and spelling as well as words and phrases that may seem inaccurate. For any questions or concerns feel free to contact me for clarification       ________________________    Eulice Netters.  Marisol Garrett APRN, [address], [address] [place] [address]  Office [phone]  Fax [phone]

## 2022-09-09 ENCOUNTER — OFFICE VISIT (OUTPATIENT)
Dept: GERIATRIC MEDICINE | Age: 87
End: 2022-09-09
Payer: MEDICARE

## 2022-09-09 DIAGNOSIS — I48.91 ATRIAL FIBRILLATION, UNSPECIFIED TYPE (HCC): Primary | ICD-10-CM

## 2022-09-09 PROCEDURE — 1123F ACP DISCUSS/DSCN MKR DOCD: CPT | Performed by: NURSE PRACTITIONER

## 2022-09-09 PROCEDURE — 99308 SBSQ NF CARE LOW MDM 20: CPT | Performed by: NURSE PRACTITIONER

## 2022-09-11 NOTE — PROGRESS NOTES
SUBJECTIVE:  This 77-year-old woman seen for follow-up visit for Parkinson's weakness A. fib patient been globally weak unsteady at times no recent emesis fevers chills no chest palpitation or change in her bowel bladder habits globally weak notes acute pain crisis. ROS: Confused at baseline minimally dysarthric  The rest of the 14 point ROS negative    PHYSICAL EXAM: VSS per facility record  Flat affect pupils are small oral mucosa moist chest showed no crackles or wheezing cardiovascular showed a regular rate abdomen soft tender EXTR trace peripheral pulse    ASSESSMENT & PLAN:   Diagnosis Orders   1. Parkinson's disease ([facility] [address]        2. Moderate protein-calorie malnutrition ([facility] [address]        3. Atrial fibrillation, unspecified type ([facility] [address]            Continue beta-blocker monitor nutritional intake  Continue anticoagulation this time follow-up with cardiologist needed patient is a high risk for falls continue with supportive care        No past medical history on file. No past surgical history on file.       Current Outpatient Medications on File Prior to Visit   Medication Sig Dispense Refill    amoxicillin (AMOXIL) 500 MG capsule Take 500 mg by mouth 3 times daily Indications: Urinary Tract Infection      apixaban (ELIQUIS) 2.5 MG TABS tablet Take 2.5 mg by mouth 2 times daily Indications: Atrial Fibrillation      atorvastatin (LIPITOR) 40 MG tablet Take 40 mg by mouth daily Indications: High Amount of Fats in the Blood      dilTIAZem (CARDIZEM 12 HR) 120 MG extended release capsule Take 120 mg by mouth daily Indications: High Blood Pressure Disorder      melatonin 3 MG TABS tablet Take 6 mg by mouth nightly as needed Indications: Trouble Sleeping Two  3mg tablets      metoprolol tartrate (LOPRESSOR) 25 MG tablet Take 25 mg by mouth 2 times daily Indications: High Blood Pressure Disorder      pantoprazole (PROTONIX) 40 MG tablet Take 40 mg by mouth daily Indications: Gastroesophageal Reflux Disease       No current facility-administered medications on file prior to visit. No family history on file.     Social History     Socioeconomic History    Marital status: Unknown     Spouse name: Not on file    Number of children: Not on file    Years of education: Not on file    Highest education level: Not on file   Occupational History    Not on file   Tobacco Use    Smoking status: Not on file    Smokeless tobacco: Not on file   Substance and Sexual Activity    Alcohol use: Not on file    Drug use: Not on file    Sexual activity: Not on file   Other Topics Concern    Not on file   Social History Narrative    Not on file     Social Determinants of Health     Financial Resource Strain: Not on file   Food Insecurity: Not on file   Transportation Needs: Not on file   Physical Activity: Not on file   Stress: Not on file   Social Connections: Not on file   Intimate Partner Violence: Not on file   Housing Stability: Not on file         No results found for: LABA1C  No results found for: EAG    Lab Results   Component Value Date/Time     08/16/2022 09:47 AM    K 3.6 08/16/2022 09:47 AM     08/16/2022 09:47 AM    CO2 21 08/16/2022 09:47 AM    BUN 14 08/16/2022 09:47 AM    CREATININE 0.59 08/16/2022 09:47 AM    GLUCOSE 107 08/16/2022 09:47 AM    CALCIUM 9.0 08/16/2022 09:47 AM        Lab Results   Component Value Date    CHOL 167 07/14/2022    CHOL 150 07/11/2022     Lab Results   Component Value Date    TRIG 102 07/14/2022    TRIG 78 07/11/2022     Lab Results   Component Value Date    HDL 35 (L) 07/14/2022    HDL 34 (L) 07/11/2022     Lab Results   Component Value Date    LDLCALC 112 07/14/2022    [address] 07/11/2022     No results found for: LABVLDL, VLDL  No results found for: CHOLHDLRATIO    No results found for: TSHFT4, TSH    Lab Results   Component Value Date    WBC 5.5 08/16/2022    HGB 15.4 08/16/2022    HCT 45.3 08/16/2022    MCV 91.6 08/16/2022     08/16/2022       Please note orders entered on site at facility after discussion with appropriate facility nursing/therapy/ / nutritional staff. Current longstanding medical problems and acute medical issues addressed with staff. Available data and data elements in on site paper chart reviewed and analyzed. Current external consultant notes reviewed in on site chart. Ordered laboratory testing and imaging will be reviewed when available.

## 2022-09-13 ENCOUNTER — OFFICE VISIT (OUTPATIENT)
Dept: GERIATRIC MEDICINE | Age: 87
End: 2022-09-13
Payer: MEDICARE

## 2022-09-13 DIAGNOSIS — I48.91 ATRIAL FIBRILLATION, UNSPECIFIED TYPE (HCC): ICD-10-CM

## 2022-09-13 DIAGNOSIS — E44.0 MODERATE PROTEIN-CALORIE MALNUTRITION (HCC): Primary | ICD-10-CM

## 2022-09-13 DIAGNOSIS — G20 PARKINSON'S DISEASE (HCC): ICD-10-CM

## 2022-09-13 PROCEDURE — 1123F ACP DISCUSS/DSCN MKR DOCD: CPT | Performed by: INTERNAL MEDICINE

## 2022-09-13 PROCEDURE — 99309 SBSQ NF CARE MODERATE MDM 30: CPT | Performed by: INTERNAL MEDICINE

## 2022-09-20 NOTE — PROGRESS NOTES
Component Value Date    WBC 5.5 08/16/2022    HGB 15.4 08/16/2022    HCT 45.3 08/16/2022    MCV 91.6 08/16/2022     08/16/2022       Please note orders entered on site at facility after discussion with appropriate facility nursing/therapy/ / nutritional staff. Current longstanding medical problems and acute medical issues addressed with staff. Available data and data elements in on site paper chart reviewed and analyzed. Current external consultant notes reviewed in on site chart. Ordered laboratory testing and imaging will be reviewed when available.

## 2022-09-28 NOTE — PROGRESS NOTES
[facility]  9/9/2022    Nova Mathew  is a 80 y.o. in the [place] being seen for a f/u of   Chief Complaint   Patient presents with    Atrial Fibrillation       HPI  Being seen for A. fib. No past medical history on file. No past surgical history on file. No family history on file. Social History     Socioeconomic History    Marital status: Unknown     Spouse name: Not on file    Number of children: Not on file    Years of education: Not on file    Highest education level: Not on file   Occupational History    Not on file   Tobacco Use    Smoking status: Not on file    Smokeless tobacco: Not on file   Substance and Sexual Activity    Alcohol use: Not on file    Drug use: Not on file    Sexual activity: Not on file   Other Topics Concern    Not on file   Social History Narrative    Not on file     Social Determinants of Health     Financial Resource Strain: Not on file   Food Insecurity: Not on file   Transportation Needs: Not on file   Physical Activity: Not on file   Stress: Not on file   Social Connections: Not on file   Intimate Partner Violence: Not on file   Housing Stability: Not on file       Allergies: Gadolinium derivatives  NF MEDICATIONS REVIEWED    ROS:  See HPI  Constitutional: There are no reports of behavioral issues, change in appetite, fever, or weakness. No gross bleeding issues. Respiratory: denies SOB, dyspnea, dyspnea on exertion,   Cardiovascular: denies CP, lightheadedness,   GI: No reports of change of bowel habits, no N/V/D/C. : no reports of change in bladder habits  Extremities: No reports of pain issues, no edema    Physical exam:   Blood pressure 122/68, temp 97.8, heart rate 78, respirations 17, pulse ox 96% room air, weight 99.9 pounds. Constitutional: Alert, elderly, frail female. In no apparent distress. Laying in bed at the time my visit. HEENT: normocephalic, [place], MMM, no cyanosis.  NO neck mass visualized   Cardiovascular: Regular rate  Respiratory: unlabored respirations, no accessory muscle use noted  GI: abdomen ND  : no bladder tenderness  Extremities: no edema,PPP  Psych: pleasant, forgetful, and able to follow simple commands, normal thought content, speech clear    ASSESSMENT:     Diagnosis Orders   1. Atrial fibrillation, unspecified type ([facility] [address]            PLAN:   Agrees with POC   Patient currently in a regular rhythm. She denies any chest pain, chest discomfort, or heart palpitations she is aware of. Return in about 1 week (around 9/16/2022), or if symptoms worsen or fail to improve. Pertinent POC, labs, have been reviewed, continue same. Encourage fluids and good nutrition. Stress fall prevention strategies. Nursing to notify Pt/POA for agreement to POC         ________________________    Maria Slade. José AGOSTO, [address]  Office [phone]  Fax [phone]      Please note this report is partially produced by using speech recognition hardware. It may contain errors related to the system, including grammar, punctuation and spelling as well as words and phrases that may seem inaccurate.   For any questions or concerns feel free to contact me for clarification

## 2022-09-29 NOTE — PROGRESS NOTES
SUBJECTIVE:  14-year-old woman seen for follow-up visit for her poor protein calorie malnutrition Parkinson's a tribulation. Patient is globally weak has been anticoagulated no new bleeding diathesis. Patient is functionally at her baseline not new pain crisis. Patient has been on aggressive Parkinson's regimen at this time      ROS: Limited by cognition  The rest of the 14 point ROS negative    PHYSICAL EXAM: VSS per facility record  Frail. HEENT bilateral temporal wasting oral mucosa dry chest showed no crackles no wheezing cardiovascular showed a regular rate abdomen soft nontender extremity trace Dors pedal pulse    ASSESSMENT & PLAN:   Diagnosis Orders   1. Moderate protein-calorie malnutrition ([facility] [address]        2. Parkinson's disease ([facility] [address]        3. Atrial fibrillation, unspecified type ([facility] [address]              Continue anticoagulation consider Sinemet at this time. Is on beta-blocker therapy        No past medical history on file. No past surgical history on file.       Current Outpatient Medications on File Prior to Visit   Medication Sig Dispense Refill    amoxicillin (AMOXIL) 500 MG capsule Take 500 mg by mouth 3 times daily Indications: Urinary Tract Infection      apixaban (ELIQUIS) 2.5 MG TABS tablet Take 2.5 mg by mouth 2 times daily Indications: Atrial Fibrillation      atorvastatin (LIPITOR) 40 MG tablet Take 40 mg by mouth daily Indications: High Amount of Fats in the Blood      dilTIAZem (CARDIZEM 12 HR) 120 MG extended release capsule Take 120 mg by mouth daily Indications: High Blood Pressure Disorder      melatonin 3 MG TABS tablet Take 6 mg by mouth nightly as needed Indications: Trouble Sleeping Two  3mg tablets      metoprolol tartrate (LOPRESSOR) 25 MG tablet Take 25 mg by mouth 2 times daily Indications: High Blood Pressure Disorder      pantoprazole (PROTONIX) 40 MG tablet Take 40 mg by mouth daily Indications: Gastroesophageal Reflux Disease       No current facility-administered medications on file prior to visit. No family history on file.     Social History     Socioeconomic History    Marital status: Unknown     Spouse name: Not on file    Number of children: Not on file    Years of education: Not on file    Highest education level: Not on file   Occupational History    Not on file   Tobacco Use    Smoking status: Not on file    Smokeless tobacco: Not on file   Substance and Sexual Activity    Alcohol use: Not on file    Drug use: Not on file    Sexual activity: Not on file   Other Topics Concern    Not on file   Social History Narrative    Not on file     Social Determinants of Health     Financial Resource Strain: Not on file   Food Insecurity: Not on file   Transportation Needs: Not on file   Physical Activity: Not on file   Stress: Not on file   Social Connections: Not on file   Intimate Partner Violence: Not on file   Housing Stability: Not on file         No results found for: LABA1C  No results found for: EAG    Lab Results   Component Value Date/Time     08/16/2022 09:47 AM    K 3.6 08/16/2022 09:47 AM     08/16/2022 09:47 AM    CO2 21 08/16/2022 09:47 AM    BUN 14 08/16/2022 09:47 AM    CREATININE 0.59 08/16/2022 09:47 AM    GLUCOSE 107 08/16/2022 09:47 AM    CALCIUM 9.0 08/16/2022 09:47 AM        Lab Results   Component Value Date    CHOL 167 07/14/2022    CHOL 150 07/11/2022     Lab Results   Component Value Date    TRIG 102 07/14/2022    TRIG 78 07/11/2022     Lab Results   Component Value Date    HDL 35 (L) 07/14/2022    HDL 34 (L) 07/11/2022     Lab Results   Component Value Date    LDLCALC 112 07/14/2022    [address] 07/11/2022     No results found for: LABVLDL, VLDL  No results found for: CHOLHDLRATIO    No results found for: TSHFT4, TSH    Lab Results   Component Value Date    WBC 5.5 08/16/2022    HGB 15.4 08/16/2022    HCT 45.3 08/16/2022    MCV 91.6 08/16/2022     08/16/2022       Please note orders entered on site at facility after discussion with appropriate facility nursing/therapy/ / nutritional staff. Current longstanding medical problems and acute medical issues addressed with staff. Available data and data elements in on site paper chart reviewed and analyzed. Current external consultant notes reviewed in on site chart. Ordered laboratory testing and imaging will be reviewed when available.